# Patient Record
Sex: MALE | Race: OTHER | NOT HISPANIC OR LATINO | URBAN - METROPOLITAN AREA
[De-identification: names, ages, dates, MRNs, and addresses within clinical notes are randomized per-mention and may not be internally consistent; named-entity substitution may affect disease eponyms.]

---

## 2023-11-29 ENCOUNTER — EMERGENCY (EMERGENCY)
Facility: HOSPITAL | Age: 28
LOS: 1 days | Discharge: HOME | End: 2023-11-29
Attending: EMERGENCY MEDICINE
Payer: SELF-PAY

## 2023-11-29 VITALS
SYSTOLIC BLOOD PRESSURE: 127 MMHG | WEIGHT: 199.96 LBS | HEART RATE: 80 BPM | DIASTOLIC BLOOD PRESSURE: 76 MMHG | TEMPERATURE: 98 F | OXYGEN SATURATION: 99 % | HEIGHT: 69 IN | RESPIRATION RATE: 16 BRPM

## 2023-11-29 PROCEDURE — 73030 X-RAY EXAM OF SHOULDER: CPT

## 2023-11-29 PROCEDURE — 73030 X-RAY EXAM OF SHOULDER: CPT | Mod: 26,RT

## 2023-11-29 PROCEDURE — 71101 X-RAY EXAM UNILAT RIBS/CHEST: CPT | Mod: 26

## 2023-11-29 PROCEDURE — 71101 X-RAY EXAM UNILAT RIBS/CHEST: CPT

## 2023-11-29 PROCEDURE — 99284 EMERGENCY DEPT VISIT MOD MDM: CPT

## 2023-11-29 PROCEDURE — 99284 EMERGENCY DEPT VISIT MOD MDM: CPT | Mod: 25

## 2023-11-29 RX ORDER — LIDOCAINE 4 G/100G
1 CREAM TOPICAL ONCE
Refills: 0 | Status: COMPLETED | OUTPATIENT
Start: 2023-11-29 | End: 2023-11-29

## 2023-11-29 RX ORDER — IBUPROFEN 200 MG
600 TABLET ORAL ONCE
Refills: 0 | Status: COMPLETED | OUTPATIENT
Start: 2023-11-29 | End: 2023-11-29

## 2023-11-29 RX ADMIN — LIDOCAINE 1 PATCH: 4 CREAM TOPICAL at 20:57

## 2023-11-29 RX ADMIN — Medication 600 MILLIGRAM(S): at 21:40

## 2023-11-29 RX ADMIN — Medication 600 MILLIGRAM(S): at 20:57

## 2023-11-29 NOTE — ED PROVIDER NOTE - ATTENDING APP SHARED VISIT CONTRIBUTION OF CARE
28-year-old male, previously healthy, on no medications, presents with fall. He states approximate 7:30 PM tonight he tripped over a curb and fell forward, landing on his face and right shoulder. He reports pain to the right shoulder girdle and between the shoulder and neck, worsened with ranging his shoulder, as well as right posterior rib pain. Denies LOC, vomiting, vision changes, chest pain, shortness of breath, abdominal pain, lower extremity symptoms, and all other symptoms. On exam, afebrile, hemodynamically stable, saturating well on room air, NAD, well appearing, sitting comfortably in bed, no WOB, speaking full sentences, head NCAT, no CTL spine TTP/step-off/deformity, PERRL, EOMI, anicteric, MMM, no hemotympanum bilaterally, RRR, nml S1/S2, no m/r/g, lungs CTAB, no w/r/r, abd soft, NT, ND, nml BS, no rebound or guarding, AAO, CN's 3-12 grossly intact, mild R shoulder girdle tenderness, no step-off/deformity/swelling, full shoulder ROM, normal distal warm/color, 2+ radial pulse, no extremity cyanosis or edema, skin warm, well perfused, no rashes or hives. Character and exam low suspicion for ICH to warrant head imaging. No evidence of neck/head/abdominal trauma. Exam low suspicion for extremity fracture/dislocation 28-year-old male, previously healthy, on no medications, presents with fall. He states approximate 7:30 PM tonight he tripped over a curb and fell forward, landing on his face and right shoulder. He reports pain to the right shoulder girdle and between the shoulder and neck, worsened with ranging his shoulder, as well as right posterior rib pain. Denies LOC, vomiting, vision changes, chest pain, shortness of breath, abdominal pain, lower extremity symptoms, and all other symptoms. On exam, afebrile, hemodynamically stable, saturating well on room air, NAD, well appearing, sitting comfortably in bed, no WOB, speaking full sentences, head NCAT, no CTL spine TTP/step-off/deformity, PERRL, EOMI, anicteric, MMM, no hemotympanum bilaterally, RRR, nml S1/S2, no m/r/g, lungs CTAB, no w/r/r, abd soft, NT, ND, nml BS, no rebound or guarding, AAO, CN's 3-12 grossly intact, mild R shoulder girdle tenderness, no step-off/deformity/swelling, full shoulder ROM, normal distal warm/color, 2+ radial pulse, no extremity cyanosis or edema, skin warm, well perfused, no rashes or hives. Character and exam low suspicion for ICH to warrant head imaging. No evidence of neck/head/abdominal trauma. Exam low suspicion for extremity fracture/dislocation and xrays unremarkable. Patient is well appearing, NAD, afebrile, hemodynamically stable. Any available tests and studies were discussed with patient. Sling applied. Discharged with instructions in further symptomatic care, return precautions, and need for f/u.

## 2023-11-29 NOTE — ED ADULT NURSE NOTE - OBJECTIVE STATEMENT
27 yo male sitting on a chair c/o pain on the right shoulder, back, and neck s/p trip and fall tonight at 1930. Patient reports hitting his head on the ground. Patient denies LOC.

## 2023-11-29 NOTE — ED PROVIDER NOTE - MUSCULOSKELETAL [+], MLM
pain to left lateral neck, right posterior superior shoulder, and left lateral posterior chest wall.

## 2023-11-29 NOTE — ED PROVIDER NOTE - NSFOLLOWUPINSTRUCTIONS_ED_ALL_ED_FT
Shoulder Pain  Many things can cause shoulder pain, including:  An injury to the shoulder.  Overuse of the shoulder.  Arthritis.  The source of the pain can be:  Inflammation.  An injury to the shoulder joint.  An injury to a tendon, ligament, or bone.  Follow these instructions at home:  Pay attention to changes in your symptoms. Let your health care provider know about them. Follow these instructions to relieve your pain.    If you have a removable sling:    Wear the sling as told by your provider. Remove it only as told by your provider.  Check the skin around the sling every day. Tell your provider about any concerns.  Loosen the sling if your fingers tingle, become numb, or become cold.  Keep the sling clean.  If the sling is not waterproof:  Do not let it get wet.  Remove it to shower or bathe.  Move your arm as little as possible, but keep your hand moving to prevent swelling.  Managing pain, stiffness, and swelling    Bag of ice on a towel on the skin.  If told, put ice on the painful area.  If you have a removable sling or immobilizer, remove it as told by your provider.  Put ice in a plastic bag.  Place a towel between your skin and the bag.  Leave the ice on for 20 minutes, 2–3 times a day.  If your skin turns bright red, remove the ice right away to prevent skin damage. The risk of damage is higher if you cannot feel pain, heat, or cold.  Move your fingers often to reduce stiffness and swelling.  Squeeze a soft ball or a foam pad as much as possible. This helps to keep the shoulder from swelling. It also helps to strengthen the arm.  General instructions    Take over-the-counter and prescription medicines only as told by your provider.  Exercise may help with pain management. Perform exercises if told by your provider.  You may be referred to a physical therapist to help in your recovery process.  Keep all follow-up visits in order to avoid any type of permanent shoulder disability or chronic pain problems.  Contact a health care provider if:  Your pain is not relieved with medicines.  New pain develops in your arm, hand, or fingers.  You loosen your sling and your arm, hand, or fingers remain tingly, numb, swollen, or painful.  Get help right away if:  Your arm, hand, or fingers turn white or blue.  This information is not intended to replace advice given to you by your health care provider. Make sure you discuss any questions you have with your health care provider.    1) Follow up with orthopedist  2) Return to the ED immediately for new or worsening symptoms ***  3) Please continue to take any home medications as prescribed  4) Your test results from your ED visit were discussed with you prior to discharge***

## 2023-11-29 NOTE — ED PROVIDER NOTE - NSFOLLOWUPCLINICS_GEN_ALL_ED_FT
[facility]  Orthopedics  [address] [place] [address]  Phone: [phone]  Fax: [phone]

## 2023-11-29 NOTE — ED PROVIDER NOTE - PROGRESS NOTE DETAILS
XR negative on my wet read.  Patient placed in sling for short term, will f/u with ortho.  Precautions provided for frozen shoulder.  Patient nontoxic and medically stable for discharge. Results discussed with patient. Return precautions provided and patient understands to return to the ED for concerning or worsening signs and symptoms. Instructed to follow up with ortho and agreeable. Patient's questions answered.

## 2023-11-29 NOTE — ED PROVIDER NOTE - CLINICAL SUMMARY MEDICAL DECISION MAKING FREE TEXT BOX
Patient is a 29 y/o male presents with injuries from fall detailed above. Unlikely ICH or skull fracture at this time. No evidence of spinal injury. Will get x-ray of right shoulder and left rib series, meds for pain, and reassess.

## 2023-11-29 NOTE — ED PROVIDER NOTE - PATIENT PORTAL LINK FT
You can access the FollowMyHealth Patient Portal offered by [facility] by registering at the following website: [email or web address]. By joining [facility]’s FollowMyHealth portal, you will also be able to view your health information using other applications (apps) compatible with our system.

## 2023-11-29 NOTE — ED ADULT NURSE NOTE - NSFALLRISKINTERV_ED_ALL_ED

## 2023-11-29 NOTE — ED PROVIDER NOTE - ENMT, MLM
denies pain/discomfort
Airway patent, Nasal mucosa clear. Mouth with normal mucosa. Throat has no vesicles, no oropharyngeal exudates and uvula is midline.

## 2023-11-29 NOTE — ED PROVIDER NOTE - OBJECTIVE STATEMENT
Patient is a 27 y/o male with no significant PMHx or PSHx presents to the ED presenting to ED with injuries from fall. Half hour pta, patient tripped over curb hitting his right head and rolling onto right shoulder. No LOC, mildly lightheaded currently. No visual changes. Endorsing pain to left lateral neck, right posterior superior shoulder, and left lateral posterior chest wall. No sensory deficits and no other injuries.

## 2023-11-29 NOTE — ED PROVIDER NOTE - NS ED ATTENDING STATEMENT MOD
This was a shared visit with the CHRISTA. I reviewed and verified the documentation and independently performed the documented:

## 2023-11-29 NOTE — ED ADULT TRIAGE NOTE - CHIEF COMPLAINT QUOTE
BIBA for s/p fall and tripped on the sidewalk, no head injury, no LOC,, right shoulder pain/ back pain

## 2023-11-29 NOTE — ED PROVIDER NOTE - MUSCULOSKELETAL, MLM
Tenderness to palpation to left lateral neck, right posterior superior shoulder, and left lateral posterior chest wall. 2+ radial pulses bilaterally, 5/5  strength. Unable to abduct right arm past 90 degrees at shoulder secondary to pain. Cap refil < 2 seconds. No sensory deficits. No midline spinal tenderness to palpation. No step offs or deformities.